# Patient Record
Sex: MALE | URBAN - METROPOLITAN AREA
[De-identification: names, ages, dates, MRNs, and addresses within clinical notes are randomized per-mention and may not be internally consistent; named-entity substitution may affect disease eponyms.]

---

## 2021-04-19 ENCOUNTER — OFFICE VISIT (OUTPATIENT)
Dept: URBAN - METROPOLITAN AREA CLINIC 83 | Facility: CLINIC | Age: 61
End: 2021-04-19
Payer: MEDICARE

## 2021-04-19 DIAGNOSIS — H25.12 AGE-RELATED NUCLEAR CATARACT, LEFT EYE: ICD-10-CM

## 2021-04-19 DIAGNOSIS — H25.21 AGE-RELATED CATARACT, MORGAGNIAN TYPE, RIGHT EYE: Primary | ICD-10-CM

## 2021-04-19 PROCEDURE — 92134 CPTRZ OPH DX IMG PST SGM RTA: CPT | Performed by: OPHTHALMOLOGY

## 2021-04-19 PROCEDURE — 76512 OPH US DX B-SCAN: CPT | Performed by: OPHTHALMOLOGY

## 2021-04-19 PROCEDURE — 92004 COMPRE OPH EXAM NEW PT 1/>: CPT | Performed by: OPHTHALMOLOGY

## 2021-04-19 ASSESSMENT — INTRAOCULAR PRESSURE
OD: 12
OS: 13

## 2021-04-19 NOTE — IMPRESSION/PLAN
Impression: Age-related cataract, morgagnian type, right eye: H25.21. Right. --Advair x 3 years --Denies Trauma Plan: --findings d/w pt in detail
--no view to the posterior segment
--B-scan u/s confirms retina fully attached, no obvious vitreous or retinal pathology, no mass/tumor
--no retinal contraindication to proceeding with phaco/IOL as planned RTC PRN

## 2021-04-19 NOTE — IMPRESSION/PLAN
Impression: Age-related nuclear cataract, left eye: H25.12. Left.  Plan: --mild, NVS, monitor
--OCT good macular contour, no ERM/CME